# Patient Record
Sex: FEMALE | Race: WHITE | Employment: UNEMPLOYED | URBAN - METROPOLITAN AREA
[De-identification: names, ages, dates, MRNs, and addresses within clinical notes are randomized per-mention and may not be internally consistent; named-entity substitution may affect disease eponyms.]

---

## 2017-07-12 ENCOUNTER — HOSPITAL ENCOUNTER (OUTPATIENT)
Dept: LAB | Age: 57
Discharge: HOME | End: 2017-07-12
Payer: COMMERCIAL

## 2017-07-12 ENCOUNTER — OFFICE VISIT (OUTPATIENT)
Dept: HEMATOLOGY | Age: 57
End: 2017-07-12

## 2017-07-12 VITALS
TEMPERATURE: 98.6 F | HEART RATE: 84 BPM | WEIGHT: 268 LBS | RESPIRATION RATE: 16 BRPM | OXYGEN SATURATION: 96 % | DIASTOLIC BLOOD PRESSURE: 80 MMHG | SYSTOLIC BLOOD PRESSURE: 135 MMHG | BODY MASS INDEX: 38.37 KG/M2 | HEIGHT: 70 IN

## 2017-07-12 DIAGNOSIS — Q44.6 POLYCYSTIC LIVER DISEASE: ICD-10-CM

## 2017-07-12 DIAGNOSIS — Q44.6 POLYCYSTIC LIVER DISEASE: Primary | ICD-10-CM

## 2017-07-12 PROBLEM — I10 HYPERTENSION: Status: ACTIVE | Noted: 2017-07-12

## 2017-07-12 PROBLEM — Z98.890 H/O ARTHROSCOPIC KNEE SURGERY: Status: ACTIVE | Noted: 2017-07-12

## 2017-07-12 PROBLEM — N18.30 STAGE 3 CHRONIC KIDNEY DISEASE (HCC): Status: ACTIVE | Noted: 2017-07-12

## 2017-07-12 PROBLEM — Q61.3 POLYCYSTIC KIDNEY: Status: ACTIVE | Noted: 2017-07-12

## 2017-07-12 LAB
ALBUMIN SERPL BCP-MCNC: 3.9 G/DL (ref 3.4–5)
ALBUMIN/GLOB SERPL: 1.2 {RATIO} (ref 0.8–1.7)
ALP SERPL-CCNC: 93 U/L (ref 45–117)
ALT SERPL-CCNC: 54 U/L (ref 13–56)
ANION GAP BLD CALC-SCNC: 13 MMOL/L (ref 3–18)
AST SERPL W P-5'-P-CCNC: 48 U/L (ref 15–37)
BASOPHILS # BLD AUTO: 0.1 K/UL (ref 0–0.06)
BASOPHILS # BLD: 1 % (ref 0–2)
BILIRUB DIRECT SERPL-MCNC: 0.1 MG/DL (ref 0–0.2)
BILIRUB SERPL-MCNC: 0.5 MG/DL (ref 0.2–1)
BUN SERPL-MCNC: 23 MG/DL (ref 7–18)
BUN/CREAT SERPL: 16 (ref 12–20)
CALCIUM SERPL-MCNC: 9.2 MG/DL (ref 8.5–10.1)
CHLORIDE SERPL-SCNC: 103 MMOL/L (ref 100–108)
CO2 SERPL-SCNC: 25 MMOL/L (ref 21–32)
CREAT SERPL-MCNC: 1.4 MG/DL (ref 0.6–1.3)
DIFFERENTIAL METHOD BLD: ABNORMAL
EOSINOPHIL # BLD: 0.2 K/UL (ref 0–0.4)
EOSINOPHIL NFR BLD: 2 % (ref 0–5)
ERYTHROCYTE [DISTWIDTH] IN BLOOD BY AUTOMATED COUNT: 13.6 % (ref 11.6–14.5)
FERRITIN SERPL-MCNC: 60 NG/ML (ref 8–388)
GLOBULIN SER CALC-MCNC: 3.3 G/DL (ref 2–4)
GLUCOSE SERPL-MCNC: 92 MG/DL (ref 74–99)
HCT VFR BLD AUTO: 40 % (ref 35–45)
HGB BLD-MCNC: 13.1 G/DL (ref 12–16)
IRON SATN MFR SERPL: 18 %
IRON SERPL-MCNC: 73 UG/DL (ref 50–175)
LYMPHOCYTES # BLD AUTO: 21 % (ref 21–52)
LYMPHOCYTES # BLD: 1.4 K/UL (ref 0.9–3.6)
MCH RBC QN AUTO: 30.8 PG (ref 24–34)
MCHC RBC AUTO-ENTMCNC: 32.8 G/DL (ref 31–37)
MCV RBC AUTO: 94.1 FL (ref 74–97)
MONOCYTES # BLD: 0.4 K/UL (ref 0.05–1.2)
MONOCYTES NFR BLD AUTO: 6 % (ref 3–10)
NEUTS SEG # BLD: 4.7 K/UL (ref 1.8–8)
NEUTS SEG NFR BLD AUTO: 70 % (ref 40–73)
PLATELET # BLD AUTO: 301 K/UL (ref 135–420)
PMV BLD AUTO: 11.6 FL (ref 9.2–11.8)
POTASSIUM SERPL-SCNC: 4.6 MMOL/L (ref 3.5–5.5)
PROT SERPL-MCNC: 7.2 G/DL (ref 6.4–8.2)
RBC # BLD AUTO: 4.25 M/UL (ref 4.2–5.3)
SODIUM SERPL-SCNC: 141 MMOL/L (ref 136–145)
TIBC SERPL-MCNC: 410 UG/DL (ref 250–450)
WBC # BLD AUTO: 6.8 K/UL (ref 4.6–13.2)

## 2017-07-12 PROCEDURE — 82103 ALPHA-1-ANTITRYPSIN TOTAL: CPT | Performed by: INTERNAL MEDICINE

## 2017-07-12 PROCEDURE — 86706 HEP B SURFACE ANTIBODY: CPT | Performed by: INTERNAL MEDICINE

## 2017-07-12 PROCEDURE — 87340 HEPATITIS B SURFACE AG IA: CPT | Performed by: INTERNAL MEDICINE

## 2017-07-12 PROCEDURE — 86038 ANTINUCLEAR ANTIBODIES: CPT | Performed by: INTERNAL MEDICINE

## 2017-07-12 PROCEDURE — 83540 ASSAY OF IRON: CPT | Performed by: INTERNAL MEDICINE

## 2017-07-12 PROCEDURE — 86803 HEPATITIS C AB TEST: CPT | Performed by: INTERNAL MEDICINE

## 2017-07-12 PROCEDURE — 82728 ASSAY OF FERRITIN: CPT | Performed by: INTERNAL MEDICINE

## 2017-07-12 PROCEDURE — 85025 COMPLETE CBC W/AUTO DIFF WBC: CPT | Performed by: INTERNAL MEDICINE

## 2017-07-12 PROCEDURE — 80076 HEPATIC FUNCTION PANEL: CPT | Performed by: INTERNAL MEDICINE

## 2017-07-12 PROCEDURE — 80048 BASIC METABOLIC PNL TOTAL CA: CPT | Performed by: INTERNAL MEDICINE

## 2017-07-12 PROCEDURE — 36415 COLL VENOUS BLD VENIPUNCTURE: CPT | Performed by: INTERNAL MEDICINE

## 2017-07-12 PROCEDURE — 86704 HEP B CORE ANTIBODY TOTAL: CPT | Performed by: INTERNAL MEDICINE

## 2017-07-12 PROCEDURE — 83516 IMMUNOASSAY NONANTIBODY: CPT | Performed by: INTERNAL MEDICINE

## 2017-07-12 PROCEDURE — 86708 HEPATITIS A ANTIBODY: CPT | Performed by: INTERNAL MEDICINE

## 2017-07-12 RX ORDER — ENALAPRIL MALEATE 10 MG/1
TABLET ORAL DAILY
COMMUNITY

## 2017-07-12 NOTE — MR AVS SNAPSHOT
Visit Information Date & Time Provider Department Dept. Phone Encounter #  
 7/12/2017 10:00 AM Barrera Radford MD Via [address] [id number] Follow-up Instructions Return in about 6 weeks (around 8/23/2017) for MLS. Upcoming Health Maintenance Date Due Hepatitis C Screening 1960 DTaP/Tdap/Td series (1 - Tdap) 6/2/1979 PAP AKA CERVICAL CYTOLOGY 6/2/1979 BREAST CANCER SCRN MAMMOGRAM 6/2/2008 FOBT Q 1 YEAR AGE 50-75 6/2/2008 INFLUENZA AGE 9 TO ADULT 8/1/2017 Allergies as of 7/12/2017  Review Complete On: 7/12/2017 By: Carlos Choe Severity Noted Reaction Type Reactions Penicillins  07/12/2017    Hives Current Immunizations  Never Reviewed No immunizations on file. Not reviewed this visit You Were Diagnosed With   
  
 Codes Comments Polycystic liver disease    -  Primary ICD-10-CM: Q44.6 ICD-9-CM: 751.62 Vitals BP Pulse Temp Resp Height(growth percentile) Weight(growth percentile) 135/80 (BP 1 Location: Left arm, BP Patient Position: Sitting) 84 98.6 °F (37 °C) (Tympanic) 16 5' 10\" (1.778 m) 268 lb (121.6 kg) SpO2 BMI OB Status Smoking Status 96% 38.45 kg/m2 Menopause Never Smoker Vitals History BMI and BSA Data Body Mass Index Body Surface Area  
 38.45 kg/m 2 2.45 m 2 Your Updated Medication List  
  
   
This list is accurate as of: 7/12/17 11:50 AM.  Always use your most recent med list.  
  
  
  
  
 enalapril 10 mg tablet Commonly known as:  Dago Abernathy Take  by mouth daily. Follow-up Instructions Return in about 6 weeks (around 8/23/2017) for MLS. To-Do List   
 07/12/2017 Lab:  ACTIN (SMOOTH MUSCLE) ANTIBODY   
  
 07/12/2017 Lab:  ALPHA-1-ANTITRYPSIN, TOTAL   
  
 07/12/2017 Lab:  ANTINUCLEAR ANTIBODIES, IFA   
  
 07/12/2017 Lab:  CBC WITH AUTOMATED DIFF   
  
 07/12/2017 Lab:  FERRITIN   
  
 07/12/2017 Lab:  HCV AB W/REFLEX VERIFICATION   
  
 07/12/2017 Lab:  HEP A AB, TOTAL   
  
 07/12/2017 Lab:  HEP B SURFACE AB   
  
 07/12/2017 Lab:  HEP B SURFACE AG   
  
 07/12/2017 Lab:  HEPATIC FUNCTION PANEL   
  
 07/12/2017 Lab:  HEPATITIS B CORE AB, TOTAL   
  
 07/12/2017 Lab:  IRON PROFILE   
  
 07/12/2017 Lab:  METABOLIC PANEL, BASIC Introducing hospitals & HEALTH SERVICES! Gideon Avilez introduces [employer] patient portal. Now you can access parts of your medical record, email your doctor's office, and request medication refills online. 1. In your internet browser, go to [email or web address] 2. Click on the First Time User? Click Here link in the Sign In box. You will see the New Member Sign Up page. 3. Enter your [employer] Access Code exactly as it appears below. You will not need to use this code after youve completed the sign-up process. If you do not sign up before the expiration date, you must request a new code. · [employer] Access Code: [email or web address] Expires: 10/10/2017 11:50 AM 
 
4. Enter the last four digits of your Social Security Number (xxxx) and Date of Birth (mm/dd/yyyy) as indicated and click Submit. You will be taken to the next sign-up page. 5. Create a [employer] ID. This will be your [employer] login ID and cannot be changed, so think of one that is secure and easy to remember. 6. Create a [employer] password. You can change your password at any time. 7. Enter your Password Reset Question and Answer. This can be used at a later time if you forget your password. 8. Enter your e-mail address. You will receive e-mail notification when new information is available in [address] 9. Click Sign Up. You can now view and download portions of your medical record. 10. Click the Download Summary menu link to download a portable copy of your medical information. If you have questions, please visit the Frequently Asked Questions section of the [employer] website. Remember, [facility] is NOT to be used for urgent needs. For medical emergencies, dial 911. Now available from your iPhone and Android! Please provide this summary of care documentation to your next provider. Your primary care clinician is listed as Griffin So. If you have any questions after today's visit, please call [phone].

## 2017-07-12 NOTE — PROGRESS NOTES
[address] Andrade Agee MD, JÚNIOR Sharpe PA-C Frederick Barban, NP        at [address], [address] [address]  [address].     [phone]     FAX: [phone]    at [facility], [address] [place] [address], [address]     [phone]     FAX: [phone]         Patient Care Team:  Haim Carrasco MD as PCP - General (Family Practice)  Carlcooper Reich MD (Nephrology)      Problem List  Date Reviewed: 7/12/2017          Codes Class Noted    Polycystic liver disease ICD-10-CM: Q44.6  ICD-9-CM: 751.62  7/12/2017        Polycystic kidney ICD-10-CM: Q61.3  ICD-9-CM: 753.12  7/12/2017        Hypertension ICD-10-CM: I10  ICD-9-CM: 401.9  7/12/2017        H/O arthroscopic knee surgery ICD-10-CM: Z98.890  ICD-9-CM: V45.89  7/12/2017                The physicians listed above have asked me to see Jamaal Garcia in consultation regarding a liver cyst.  All medical records sent by the referring physicians were reviewed including imaging studies     The patient is a 59 y.o.  female with Polycystic liver and polycystic kidney disease. A CT scan demonstrated massive hepatomegaly with hundreds cysts of various sizes throughout the liver. The patient was referred to [facility] in 2016 for possible aspiration of cysts. A few of the larger cysts were drained but this did nothing to reliove the abdominal full and distention. The most recent laboratory studies indicate that the liver transaminases are elevated, ALP is normal, tests of hepatic synthetic and metabolic function are normal, and the platelet count is normal.    The patient notes fatigue, diffuse abdominal pain, marked abdominal distention. The patient completes all daily activities without any functional limitations.       The patient has not experienced fevers, chills, ALLERGIES  Allergies   Allergen Reactions    Penicillins Hives       MEDICATIONS  Current Outpatient Prescriptions   Medication Sig    enalapril (VASOTEC) 10 mg tablet Take  by mouth daily. No current facility-administered medications for this visit. SYSTEM REVIEW NOT RELATED TO LIVER DISEASE OR REVIEWED ABOVE:  Constitution systems: Negative for fever, chills, weight gain, weight loss. Eyes: Negative for visual changes. ENT: Negative for sore throat, painful swallowing. Respiratory: Negative for cough, hemoptysis, SOB. Cardiology: Negative for chest pain, palpitations. GI:  Negative for constipation or diarrhea. : Negative for urinary frequency, dysuria, hematuria, nocturia. Skin: Negative for rash. Hematology: Negative for easy bruising, blood clots. Musculo-skelatal: Negative for back pain, muscle pain, weakness. Neurologic: Negative for headaches, dizziness, vertigo, memory problems not related to HE. Psychology: Negative for anxiety, depression. FAMILY HISTORY:  The father  of PKD. The mother  at age 80 years. Two brothers have PKD. ONe has had a renal transplant. The other is on dialysis. There is no family history of cystic disease of the liver. SOCIAL HISTORY:  The patient is . The patient has 2 children,   The patient has never used tobacco products. The patient consumes alcohol on social occasions never in excess. The patient does not work. PHYSICAL EXAMINATION:  Visit Vitals    /80 (BP 1 Location: Left arm, BP Patient Position: Sitting)    Pulse 84    Temp 98.6 °F (37 °C) (Tympanic)    Resp 16    Ht 5' 10\" (1.778 m)    Wt 268 lb (121.6 kg)    SpO2 96%    BMI 38.45 kg/m2     General: No acute distress. Eyes: Sclera anicteric. ENT: No oral lesions. Thyroid normal.  Nodes: No adenopathy. Skin: No spider angiomata. No jaundice. No palmar erythema. Respiratory: Lungs clear to auscultation. Cardiovascular: Regular heart rate. No murmurs. No JVD. Abdomen: markedly distended. The liver is hard. Hepatomegaly with the liver extending half way down the abdomen and across the midline. Extremities: No edema. No muscle wasting. No gross arthritic changes. Neurologic: Alert and oriented. Cranial nerves grossly intact. No asterixis. LABORATORY STUDIES:  From 6/2016  AST/ALT/ALP/T Bili/ALB:  41/43/84/0.6/4.0    [facility] Latest Ref Rng & Units 7/12/2017   WBC 4.6 - 13.2 K/uL 6.8   ANC 1.8 - 8.0 K/UL 4.7   HGB 12.0 - 16.0 g/dL 13.1    - 420 K/uL 301   AST 15 - 37 U/L 48 (H)   ALT 13 - 56 U/L 54   Alk Phos 45 - 117 U/L 93   Bili, Total 0.2 - 1.0 MG/DL 0.5   Bili, Direct 0.0 - 0.2 MG/DL 0.1   Albumin 3.4 - 5.0 g/dL 3.9   BUN 7.0 - 18 MG/DL 23 (H)   Creat 0.6 - 1.3 MG/DL 1.40 (H)   Na 136 - 145 mmol/L 141   K 3.5 - 5.5 mmol/L 4.6   Cl 100 - 108 mmol/L 103   CO2 21 - 32 mmol/L 25   Glucose 74 - 99 mg/dL 92     SEROLOGIES:  Not available or performed. Testing was performed today. LIVER HISTOLOGY:  Not available or performed    ENDOSCOPIC PROCEDURES:  Not available or performed    RADIOLOGY:  12/2015. Ultrasound of liver. Hepatomegaly. Numerous cysts throughout the liver and kidney consistent with polycystic liver and renal disease. OTHER TESTING:  Not available or performed    ASSESSMENT AND PLAN:  Polycystic liver disease     Liver transaminases are elevated. Alkaline phosphatase is normal.  Liver function is normal.  The platelet count is normal.      Will perform laboratory testing to monitor liver function and degree of liver injury. This will include BMP, hepatic panel, CBC with platelet count,     It is distinctly unusual for patients with polycystic liver disease to have elevations in liver transaminases. Will perform serologic serologic and virologic studies to assess for various causes of chronic liver disease.     The patient is is very symptomatic from the very large liver with marked abdominal distention and abdominal pain. She does not have early satiety and weight loss. Discussed the natural history of polycystic liver and that there is no good treatment for the disorder short of liver transplant. Discussed that polycystic lvier does not lead to chronic liver disease and liver failure. This is different than polycystic renal disease which is common cause of ESRD. Discussed the role of liver transplant. Discussed living donor liver transplant. Discussed surgical resection of the liver to reduce the size and  That the liver simply regenerates back to its former size within a few months. There are no contraindications for the patient to take any medications that are necessary for treatment of other medical issues. The need for vaccination against viral hepatitis A and B will be assessed with serologic and instituted as appropriate. All of the above issues were discussed with the patient. All questions were answered. The patient expressed a clear understanding of the above. [address] [address] 6 weeks to review all data and determine the treatment plan.     Aura Romberg, MD  Liver [place] of [address] [place] [address], [address] [place] [address] Hilary Manzo, [address]  [phone]

## 2017-07-13 LAB
A1AT SERPL-MCNC: 161 MG/DL (ref 90–200)
ACTIN IGG SERPL-ACNC: 15 UNITS (ref 0–19)
ANA TITR SER IF: NEGATIVE {TITER}
COMMENT, 144067: NORMAL
HAV AB SER QL IA: NEGATIVE
HBV CORE AB SERPL QL IA: NEGATIVE
HBV SURFACE AB SER QL IA: NEGATIVE
HBV SURFACE AB SERPL IA-ACNC: <3.1 MIU/ML
HBV SURFACE AG SER QL: <0.1 INDEX
HBV SURFACE AG SER QL: NEGATIVE
HCV AB S/CO SERPL IA: <0.1 S/CO RATIO (ref 0–0.9)
HEP BS AB COMMENT,HBSAC: ABNORMAL

## 2017-08-30 ENCOUNTER — OFFICE VISIT (OUTPATIENT)
Dept: HEMATOLOGY | Age: 57
End: 2017-08-30

## 2017-08-30 VITALS
OXYGEN SATURATION: 100 % | DIASTOLIC BLOOD PRESSURE: 82 MMHG | TEMPERATURE: 98.9 F | WEIGHT: 266 LBS | SYSTOLIC BLOOD PRESSURE: 148 MMHG | HEART RATE: 85 BPM | HEIGHT: 70 IN | RESPIRATION RATE: 18 BRPM | BODY MASS INDEX: 38.08 KG/M2

## 2017-08-30 DIAGNOSIS — Q44.6 POLYCYSTIC LIVER DISEASE: Primary | ICD-10-CM

## 2017-08-30 NOTE — PROGRESS NOTES
[address] Samara Perez MD, [address], [place], [place]       JÚNIOR Cook PA-C [facility]   JÚNIOR Mercado NP        [address], [address] [address]     [phone]     FAX: [phone]    at [address], [address] [place] [address], [address]     [phone]     FAX: [phone]       Patient Care Team:  Ida Tamayo MD as PCP - General (Family Practice)  Andre Paul MD (Nephrology)      Problem List  Date Reviewed: 7/12/2017          Codes Class Noted    Polycystic liver disease ICD-10-CM: Q44.6  ICD-9-CM: 751.62  7/12/2017        Polycystic kidney ICD-10-CM: Q61.3  ICD-9-CM: 753.12  7/12/2017        Hypertension ICD-10-CM: I10  ICD-9-CM: 401.9  7/12/2017        H/O arthroscopic knee surgery ICD-10-CM: Z98.890  ICD-9-CM: V45.89  7/12/2017        Stage 3 chronic kidney disease ICD-10-CM: N18.3  ICD-9-CM: 585.3  7/12/2017                Carlito Sandoval returns to the [address] for management of a Polycystic liver disease. The active problem list, all pertinent past medical history, medications, radiologic findings and laboratory findings related to the liver disorder were reviewed with the patient. The patient is a 59 y.o.  female with Polycystic liver and polycystic kidney disease. A CT scan demonstrated massive hepatomegaly with hundreds of cysts of various sizes throughout the liver. [facility]. A few of the larger cysts were drained but this did nothing to relieve the abdominal fullness and distention.     The most recent laboratory studies indicate that the liver transaminases are elevated, ALP is normal, tests of hepatic synthetic and metabolic function are normal, and the platelet count is normal.    The patient notes fatigue, diffuse abdominal pain, marked abdominal distention. The patient completes all daily activities without any functional limitations. The patient has not experienced fevers, chills,       ALLERGIES  Allergies   Allergen Reactions    Penicillins Hives       MEDICATIONS  Current Outpatient Prescriptions   Medication Sig    enalapril (VASOTEC) 10 mg tablet Take  by mouth daily. No current facility-administered medications for this visit. SYSTEM REVIEW NOT RELATED TO LIVER DISEASE OR REVIEWED ABOVE:  Constitution systems: Negative for fever, chills, weight gain, weight loss. Eyes: Negative for visual changes. ENT: Negative for sore throat, painful swallowing. Respiratory: Negative for cough, hemoptysis, SOB. Cardiology: Negative for chest pain, palpitations. GI:  Negative for constipation or diarrhea. : Negative for urinary frequency, dysuria, hematuria, nocturia. Skin: Negative for rash. Hematology: Negative for easy bruising, blood clots. Musculo-skelatal: Negative for back pain, muscle pain, weakness. Neurologic: Negative for headaches, dizziness, vertigo, memory problems not related to HE. Psychology: Negative for anxiety, depression. FAMILY HISTORY:  The father  of PKD. The mother  at age 80 years. Two brothers have PKD. ONe has had a renal transplant. The other is on dialysis. There is no family history of cystic disease of the liver. SOCIAL HISTORY:  The patient is . The patient has 2 children,   The patient has never used tobacco products. The patient consumes alcohol on social occasions never in excess. The patient does not work.         PHYSICAL EXAMINATION:  Visit Vitals    /82 (BP 1 Location: Right arm, BP Patient Position: Sitting)    Pulse 85    Temp 98.9 °F (37.2 °C) (Tympanic)    Resp 18    Ht 5' 10\" (1.778 m)    Wt 266 lb (120.7 kg)    SpO2 100%    BMI 38.17 kg/m2     General: No acute distress. Eyes: Sclera anicteric. ENT: No oral lesions. Thyroid normal.  Nodes: No adenopathy. Skin: No spider angiomata. No jaundice. No palmar erythema. Respiratory: Lungs clear to auscultation. Cardiovascular: Regular heart rate. No murmurs. No JVD. Abdomen: Markedly distended. The liver is hard. Hepatomegaly with the liver extending half way down the abdomen and across the midline. Extremities: No edema. No muscle wasting. No gross arthritic changes. Neurologic: Alert and oriented. Cranial nerves grossly intact. No asterixis. LABORATORY STUDIES:  From 6/2016  AST/ALT/ALP/T Bili/ALB:  41/43/84/0.6/4.0    Liver [facility] Ref Rng & Units 7/12/2017   WBC 4.6 - 13.2 K/uL 6.8   ANC 1.8 - 8.0 K/UL 4.7   HGB 12.0 - 16.0 g/dL 13.1    - 420 K/uL 301   AST 15 - 37 U/L 48 (H)   ALT 13 - 56 U/L 54   Alk Phos 45 - 117 U/L 93   Bili, Total 0.2 - 1.0 MG/DL 0.5   Bili, Direct 0.0 - 0.2 MG/DL 0.1   Albumin 3.4 - 5.0 g/dL 3.9   BUN 7.0 - 18 MG/DL 23 (H)   Creat 0.6 - 1.3 MG/DL 1.40 (H)   Na 136 - 145 mmol/L 141   K 3.5 - 5.5 mmol/L 4.6   Cl 100 - 108 mmol/L 103   CO2 21 - 32 mmol/L 25   Glucose 74 - 99 mg/dL 92     SEROLOGIES:  Serologies Latest Ref Rng & Units 7/12/2017   Hep A Ab, Total NEGATIVE   NEGATIVE   Hep B Surface Ag <1.00 Index <0.10   Hep B Surface Ag Interp NEG   NEGATIVE   Hep B Core Ab, Total NEGATIVE   NEGATIVE   Hep B Surface Ab >10.0 mIU/mL <3.10 (L)   Hep B Surface Ab Interp POS   NEGATIVE (A)   Hep C Ab 0.0 - 0.9 s/co ratio <0.1   Ferritin 8 - 388 NG/ML 60   Iron % Saturation % 18   GLORIA, IFA  NEGATIVE   ASMCA 0 - 19 Units 15   Alpha-1 antitrypsin level 90 - 200 mg/dL 161     LIVER HISTOLOGY:  Not available or performed    ENDOSCOPIC PROCEDURES:  Not available or performed    RADIOLOGY:  12/2015. Ultrasound of liver. Hepatomegaly. Numerous cysts throughout the liver and kidney consistent with polycystic liver and renal disease. 5/2017. Ultrasound of liver. Hepatomegaly. Numerous cysts throughout the liver. OTHER TESTING:  Not available or performed    ASSESSMENT AND PLAN:  Polycystic liver disease     The AST is elevated. The ALT is normal.  Alkaline phosphatase is normal.  Liver function is normal.  The platelet count is normal.      Serologic tests for various causes of chronic liver disease are negative. The etiology for the elevated AST is not clear at this time. The patient is is very symptomatic from the very large liver with marked abdominal distention and abdominal pain. She does not have early satiety and weight loss. Discussed the natural history of polycystic liver and that there is no good treatment for the disorder short of liver transplant. Discussed that polycystic lvier does not lead to chronic liver disease and liver failure. This is different than polycystic renal disease which is common cause of ESRD. Discussed the role of liver transplant. Discussed living donor liver transplant. Discussed surgical resection of the liver to reduce the size and  That the liver simply regenerates back to its former size within a few months. There are no contraindications for the patient to take any medications that are necessary for treatment of other medical issues. The need for vaccination against viral hepatitis A and B will be assessed with serologic and instituted as appropriate. All of the above issues were discussed with the patient. All questions were answered. The patient expressed a clear understanding of the above. [facility] [address] 6 weeks to review all data and determine the treatment plan.     John Paul Bowens MD  Liver [place] of [address] [place] [address], [address] [place] [address] Street [address]  [phone]

## 2018-05-30 DIAGNOSIS — Q44.6 POLYCYSTIC LIVER DISEASE: Primary | ICD-10-CM

## 2018-08-06 ENCOUNTER — DOCUMENTATION ONLY (OUTPATIENT)
Dept: HEMATOLOGY | Age: 58
End: 2018-08-06

## 2018-08-06 ENCOUNTER — HOSPITAL ENCOUNTER (OUTPATIENT)
Dept: LAB | Age: 58
Discharge: HOME | End: 2018-08-06
Payer: COMMERCIAL

## 2018-08-06 DIAGNOSIS — Q44.6 POLYCYSTIC LIVER DISEASE: ICD-10-CM

## 2018-08-06 LAB
ALBUMIN SERPL-MCNC: 3.5 G/DL (ref 3.4–5)
ALBUMIN/GLOB SERPL: 1.1 {RATIO} (ref 0.8–1.7)
ALP SERPL-CCNC: 96 U/L (ref 45–117)
ALT SERPL-CCNC: 56 U/L (ref 13–56)
ANION GAP SERPL CALC-SCNC: 4 MMOL/L (ref 3–18)
AST SERPL-CCNC: 43 U/L (ref 15–37)
BILIRUB DIRECT SERPL-MCNC: 0.1 MG/DL (ref 0–0.2)
BILIRUB SERPL-MCNC: 0.4 MG/DL (ref 0.2–1)
BUN SERPL-MCNC: 30 MG/DL (ref 7–18)
BUN/CREAT SERPL: 21 (ref 12–20)
CALCIUM SERPL-MCNC: 9.4 MG/DL (ref 8.5–10.1)
CHLORIDE SERPL-SCNC: 106 MMOL/L (ref 100–108)
CO2 SERPL-SCNC: 32 MMOL/L (ref 21–32)
CREAT SERPL-MCNC: 1.42 MG/DL (ref 0.6–1.3)
GLOBULIN SER CALC-MCNC: 3.3 G/DL (ref 2–4)
GLUCOSE SERPL-MCNC: 98 MG/DL (ref 74–99)
POTASSIUM SERPL-SCNC: 4.8 MMOL/L (ref 3.5–5.5)
PROT SERPL-MCNC: 6.8 G/DL (ref 6.4–8.2)
SODIUM SERPL-SCNC: 142 MMOL/L (ref 136–145)

## 2018-08-06 PROCEDURE — 36415 COLL VENOUS BLD VENIPUNCTURE: CPT | Performed by: INTERNAL MEDICINE

## 2018-08-06 PROCEDURE — 80076 HEPATIC FUNCTION PANEL: CPT | Performed by: INTERNAL MEDICINE

## 2018-08-06 PROCEDURE — 80048 BASIC METABOLIC PNL TOTAL CA: CPT | Performed by: INTERNAL MEDICINE

## 2018-08-06 NOTE — PROGRESS NOTES
Patient came in to get labs drawn 08/06/18 7:56am, patient requested only getting hepatic function panel drawn due to already have cbc and metabolic drawn at [facility] prior to date . Shahzad Sánchez Verified with patient and Jose Garibay, ! Patient has appt on 8/15/18 and will show copy of results on phone when come in for appt.

## 2018-08-15 ENCOUNTER — OFFICE VISIT (OUTPATIENT)
Dept: HEMATOLOGY | Age: 58
End: 2018-08-15

## 2018-08-15 VITALS
SYSTOLIC BLOOD PRESSURE: 137 MMHG | RESPIRATION RATE: 16 BRPM | DIASTOLIC BLOOD PRESSURE: 73 MMHG | WEIGHT: 278 LBS | HEIGHT: 70 IN | HEART RATE: 76 BPM | TEMPERATURE: 97.9 F | OXYGEN SATURATION: 98 % | BODY MASS INDEX: 39.8 KG/M2

## 2018-08-15 DIAGNOSIS — Q44.6 POLYCYSTIC LIVER DISEASE: Primary | ICD-10-CM

## 2018-08-15 NOTE — PROGRESS NOTES
[address], [place], [address], [place], [place]       Philip Viera, KASSI Shukla, [facility]   JÚNIOR Gutierrez NP [address] [facility] 136    at [facility] AT [address], [address] Roxana Briscoe  22.    [phone]    FAX: [address] [place] [address], [address]    [phone]    FAX: [phone]         Patient Care Team:  Staci Rose MD as PCP - General (Family Practice)  Nigel Navarro MD (Nephrology)      Problem List  Date Reviewed: 11/14/2017          Codes Class Noted    Polycystic liver disease ICD-10-CM: Q44.6  ICD-9-CM: 751.62  7/12/2017        Polycystic kidney ICD-10-CM: Q61.3  ICD-9-CM: 753.12  7/12/2017        Hypertension ICD-10-CM: I10  ICD-9-CM: 401.9  7/12/2017        H/O arthroscopic knee surgery ICD-10-CM: Z98.890  ICD-9-CM: V45.89  7/12/2017        Stage 3 chronic kidney disease ICD-10-CM: N18.3  ICD-9-CM: 585.3  7/12/2017                Catrachito Taylor returns to the [address] for management of a Polycystic liver disease. The active problem list, all pertinent past medical history, medications, radiologic findings and laboratory findings related to the liver disorder were reviewed with the patient. The patient is a 59 y.o.  female with Polycystic liver and polycystic kidney disease. A CT scan demonstrated massive hepatomegaly with hundreds of cysts of various sizes throughout the liver. The patient was referred to [facility] in 2016 for possible aspiration of cysts. A few of the larger cysts were drained but this did nothing to relieve the abdominal fullness and distention.     The patient notes fatigue, diffuse abdominal pain, marked abdominal distention. The patient completes all daily activities without any functional limitations. The patient has not experienced fevers, chills,       ASSESSMENT AND PLAN:  Polycystic liver disease   The AST is elevated. The ALT is normal.  Alkaline phosphatase is normal.  Liver function is normal.  The platelet count is normal.    Serologic tests for various causes of chronic liver disease are negative. The patient is is very symptomatic from the very large liver with marked abdominal distention and abdominal pain. She does not have early satiety and weight loss. Discussed the natural history of polycystic liver and that there is no good treatment for the disorder short of liver transplant. Discussed that polycystic liver does not lead to chronic liver disease and liver failure. This is different than polycystic renal disease which is common cause of ESRD. Discussed the role of liver transplant. Discussed living donor liver transplant. Discussed surgical resection of the liver to reduce the size and  That the liver simply regenerates back to its former size within a few months. She is starting to develop progressive CKD from polycystic kidney disease. IF and when she needs a renal trnasplant I would suggest she be considered for simultaneous renal-liver transplants. Treatment of other medical problems in patients with chronic liver disease  There are no contraindications for the patient to take any medications that are necessary for treatment of other medical issues. Counseling for alcohol in patients with chronic liver disease  The patient was counseled regarding alcohol consumption and the effect of alcohol on chronic liver disease. The patient does not consume any significant amount of alcohol. Vaccinations   Vaccination for viral hepatitis A and B is recommended since the patient has no serologic evidence of previous exposure or vaccination with immunity.   Routine vaccinations against other bacterial and viral agents can be performed as indicated. Annual flu vaccination should be administered if indicated. ALLERGIES  Allergies   Allergen Reactions    Penicillins Hives       MEDICATIONS  Current Outpatient Prescriptions   Medication Sig    enalapril (VASOTEC) 10 mg tablet Take  by mouth daily. No current facility-administered medications for this visit. SYSTEM REVIEW NOT RELATED TO LIVER DISEASE OR REVIEWED ABOVE:  Constitution systems: Negative for fever, chills, weight gain, weight loss. Eyes: Negative for visual changes. ENT: Negative for sore throat, painful swallowing. Respiratory: Negative for cough, hemoptysis, SOB. Cardiology: Negative for chest pain, palpitations. GI:  Negative for constipation or diarrhea. : Negative for urinary frequency, dysuria, hematuria, nocturia. Skin: Negative for rash. Hematology: Negative for easy bruising, blood clots. Musculo-skelatal: Negative for back pain, muscle pain, weakness. Neurologic: Negative for headaches, dizziness, vertigo, memory problems not related to HE. Psychology: Negative for anxiety, depression. FAMILY HISTORY:  The father  of PKD. The mother  at age 80 years. Two brothers have PKD. ONe has had a renal transplant. The other is on dialysis. There is no family history of cystic disease of the liver. SOCIAL HISTORY:  The patient is . The patient has 2 children,   The patient has never used tobacco products. The patient consumes alcohol on social occasions never in excess. The patient does not work. PHYSICAL EXAMINATION:  Visit Vitals    /73 (BP 1 Location: Left arm, BP Patient Position: Sitting)    Pulse 76    Temp 97.9 °F (36.6 °C) (Tympanic)    Resp 16    Ht 5' 10\" (1.778 m)    Wt 278 lb (126.1 kg)    SpO2 98%    BMI 39.89 kg/m2     General: No acute distress. Eyes: Sclera anicteric. ENT: No oral lesions.   Thyroid normal.  Nodes: No adenopathy. Skin: No spider angiomata. No jaundice. No palmar erythema. Respiratory: Lungs clear to auscultation. Cardiovascular: Regular heart rate. No murmurs. No JVD. Abdomen: Markedly distended. The liver is hard. Hepatomegaly with the liver extending half way down the abdomen and across the midline. Extremities: No edema. No muscle wasting. No gross arthritic changes. Neurologic: Alert and oriented. Cranial nerves grossly intact. No asterixis. LABORATORY STUDIES:  Liver [place] of [address] [place] [address] Units 8/6/2018 7/12/2017   WBC 4.6 - 13.2 K/uL  6.8   ANC 1.8 - 8.0 K/UL  4.7   HGB 12.0 - 16.0 g/dL  13.1    - 420 K/uL  301   AST 15 - 37 U/L 43 (H) 48 (H)   ALT 13 - 56 U/L 56 54   Alk Phos 45 - 117 U/L 96 93   Bili, Total 0.2 - 1.0 MG/DL 0.4 0.5   Bili, Direct 0.0 - 0.2 MG/DL 0.1 0.1   Albumin 3.4 - 5.0 g/dL 3.5 3.9   BUN 7.0 - 18 MG/DL 30 (H) 23 (H)   Creat 0.6 - 1.3 MG/DL 1.42 (H) 1.40 (H)   Na 136 - 145 mmol/L 142 141   K 3.5 - 5.5 mmol/L 4.8 4.6   Cl 100 - 108 mmol/L 106 103   CO2 21 - 32 mmol/L 32 25   Glucose 74 - 99 mg/dL 98 92       SEROLOGIES:  Serologies Latest Ref Rng & Units 7/12/2017   Hep A Ab, Total NEGATIVE   NEGATIVE   Hep B Surface Ag <1.00 Index <0.10   Hep B Surface Ag Interp NEG   NEGATIVE   Hep B Core Ab, Total NEGATIVE   NEGATIVE   Hep B Surface Ab >10.0 mIU/mL <3.10 (L)   Hep B Surface Ab Interp POS   NEGATIVE (A)   Hep C Ab 0.0 - 0.9 s/co ratio <0.1   Ferritin 8 - 388 NG/ML 60   Iron % Saturation % 18   GLORIA, IFA  NEGATIVE   ASMCA 0 - 19 Units 15   Alpha-1 antitrypsin level 90 - 200 mg/dL 161     LIVER HISTOLOGY:  Not available or performed    ENDOSCOPIC PROCEDURES:  Not available or performed    RADIOLOGY:  12/2015. Ultrasound of liver. Hepatomegaly. Numerous cysts throughout the liver and kidney consistent with polycystic liver and renal disease. 5/2017. Ultrasound of liver. Hepatomegaly. Numerous cysts throughout the liver.       OTHER TESTING:  Not available or performed    FOLLOW-UP:  All of the issues listed above in the Assessment and Plan were discussed with the patient. All questions were answered. The patient expressed a clear understanding of the above.     19[address] [facility] 87 in 12 months       Martha Khan [place]  [address] [address] [facility], 300 [facility] [address]  [address] [place] [address]

## 2018-08-15 NOTE — MR AVS SNAPSHOT
[address] [place] [address] 
[phone] Patient: Racehl Wei MRN: [id number] Visit Information Date & Time Provider Department Dept. Phone Encounter #  
 8/15/2018 12:00 PM Rad Solis MD [address] [place] [address] of [address] [id number] Upcoming Health Maintenance Date Due DTaP/Tdap/Td series (1 - Tdap) 1979 PAP AKA CERVICAL CYTOLOGY 1979 BREAST CANCER SCRN MAMMOGRAM 2008 FOBT Q 1 YEAR AGE 50-75 2008 ZOSTER VACCINE AGE 60> 2018 Influenza Age 5 to Adult 2018 Allergies as of 8/15/2018  Review Complete On: 8/15/2018 By: Todd Corral Severity Noted Reaction Type Reactions Penicillins  2017    Hives Current Immunizations  Never Reviewed No immunizations on file. Not reviewed this visit Vitals BP Pulse Temp Resp Height(growth percentile)  
 137/73 (BP 1 Location: Left arm, BP Patient Position: Sitting) 76 97.9 °F (36.6 °C) (Tympanic) 16 5' 10\" (1.778 m) Weight(growth percentile) SpO2 BMI OB Status Smoking Status 278 lb (126.1 kg) 98% 39.89 kg/m2 Menopause Never Smoker Vitals History BMI and BSA Data Body Mass Index Body Surface Area  
 39.89 kg/m 2 2.5 m 2 Your Updated Medication List  
  
   
This list is accurate as of 8/15/18  1:09 PM.  Always use your most recent med list.  
  
  
  
  
 enalapril 10 mg tablet Commonly known as:  Elvin Jackson Take  by mouth daily. Introducing [facility]! [facility] introduces [employer] patient portal. Now you can access parts of your medical record, email your doctor's office, and request medication refills online. 1. In your internet browser, go to [email or web address] 2. Click on the First Time User? Click Here link in the Sign In box. You will see the New Member Sign Up page. 3. Enter your [employer] Access Code exactly as it appears below. You will not need to use this code after youve completed the sign-up process. If you do not sign up before the expiration date, you must request a new code. · [employer] Access Code: [email or web address] Expires: 11/13/2018  1:08 PM 
 
4. Enter the last four digits of your Social Security Number (xxxx) and Date of Birth (mm/dd/yyyy) as indicated and click Submit. You will be taken to the next sign-up page. 5. Create a [employer] ID. This will be your [employer] login ID and cannot be changed, so think of one that is secure and easy to remember. 6. Create a [employer] password. You can change your password at any time. 7. Enter your Password Reset Question and Answer. This can be used at a later time if you forget your password. 8. Enter your e-mail address. You will receive e-mail notification when new information is available in [address] 9. Click Sign Up. You can now view and download portions of your medical record. 10. Click the Download Summary menu link to download a portable copy of your medical information. If you have questions, please visit the Frequently Asked Questions section of the [employer] website. Remember, [employer] is NOT to be used for urgent needs. For medical emergencies, dial 911. Now available from your iPhone and Android! Please provide this summary of care documentation to your next provider. Your primary care clinician is listed as Floresita Ramirez. If you have any questions after today's visit, please call [phone].

## 2019-07-11 ENCOUNTER — TELEPHONE (OUTPATIENT)
Dept: HEMATOLOGY | Age: 59
End: 2019-07-11

## 2019-07-11 DIAGNOSIS — Q44.6 POLYCYSTIC LIVER DISEASE: Primary | ICD-10-CM

## 2019-07-11 NOTE — TELEPHONE ENCOUNTER
Patient called requesting a lab order to be put in so she can get labs prior to the upcoming visit. Put in a repeat of last labs and pending to provider.

## 2019-07-13 DIAGNOSIS — Q44.6 POLYCYSTIC LIVER DISEASE: Primary | ICD-10-CM

## 2019-08-14 ENCOUNTER — HOSPITAL ENCOUNTER (OUTPATIENT)
Dept: LAB | Age: 59
Discharge: HOME | End: 2019-08-14
Payer: COMMERCIAL

## 2019-08-14 LAB
ALBUMIN SERPL-MCNC: 3.5 G/DL (ref 3.4–5)
ALBUMIN/GLOB SERPL: 1.1 {RATIO} (ref 0.8–1.7)
ALP SERPL-CCNC: 106 U/L (ref 45–117)
ALT SERPL-CCNC: 56 U/L (ref 13–56)
ANION GAP SERPL CALC-SCNC: 8 MMOL/L (ref 3–18)
AST SERPL-CCNC: 29 U/L (ref 10–38)
BASOPHILS # BLD: 0.1 K/UL (ref 0–0.1)
BASOPHILS NFR BLD: 1 % (ref 0–2)
BILIRUB SERPL-MCNC: 0.5 MG/DL (ref 0.2–1)
BUN SERPL-MCNC: 30 MG/DL (ref 7–18)
BUN/CREAT SERPL: 19 (ref 12–20)
CALCIUM SERPL-MCNC: 9.2 MG/DL (ref 8.5–10.1)
CHLORIDE SERPL-SCNC: 107 MMOL/L (ref 100–111)
CO2 SERPL-SCNC: 28 MMOL/L (ref 21–32)
CREAT SERPL-MCNC: 1.58 MG/DL (ref 0.6–1.3)
DIFFERENTIAL METHOD BLD: ABNORMAL
EOSINOPHIL # BLD: 0.2 K/UL (ref 0–0.4)
EOSINOPHIL NFR BLD: 3 % (ref 0–5)
ERYTHROCYTE [DISTWIDTH] IN BLOOD BY AUTOMATED COUNT: 13.9 % (ref 11.6–14.5)
GLOBULIN SER CALC-MCNC: 3.3 G/DL (ref 2–4)
GLUCOSE SERPL-MCNC: 89 MG/DL (ref 74–99)
HCT VFR BLD AUTO: 39.8 % (ref 35–45)
HGB BLD-MCNC: 12.8 G/DL (ref 12–16)
LYMPHOCYTES # BLD: 1.3 K/UL (ref 0.9–3.6)
LYMPHOCYTES NFR BLD: 20 % (ref 21–52)
MCH RBC QN AUTO: 29.8 PG (ref 24–34)
MCHC RBC AUTO-ENTMCNC: 32.2 G/DL (ref 31–37)
MCV RBC AUTO: 92.8 FL (ref 74–97)
MONOCYTES # BLD: 0.4 K/UL (ref 0.05–1.2)
MONOCYTES NFR BLD: 6 % (ref 3–10)
NEUTS SEG # BLD: 4.5 K/UL (ref 1.8–8)
NEUTS SEG NFR BLD: 70 % (ref 40–73)
PLATELET # BLD AUTO: 289 K/UL (ref 135–420)
PMV BLD AUTO: 11.1 FL (ref 9.2–11.8)
POTASSIUM SERPL-SCNC: 4.6 MMOL/L (ref 3.5–5.5)
PROT SERPL-MCNC: 6.8 G/DL (ref 6.4–8.2)
RBC # BLD AUTO: 4.29 M/UL (ref 4.2–5.3)
SODIUM SERPL-SCNC: 143 MMOL/L (ref 136–145)
WBC # BLD AUTO: 6.4 K/UL (ref 4.6–13.2)

## 2019-08-14 PROCEDURE — 80053 COMPREHEN METABOLIC PANEL: CPT

## 2019-08-14 PROCEDURE — 36415 COLL VENOUS BLD VENIPUNCTURE: CPT

## 2019-08-14 PROCEDURE — 85025 COMPLETE CBC W/AUTO DIFF WBC: CPT

## 2019-08-19 ENCOUNTER — OFFICE VISIT (OUTPATIENT)
Dept: HEMATOLOGY | Age: 59
End: 2019-08-19

## 2019-08-19 VITALS
DIASTOLIC BLOOD PRESSURE: 87 MMHG | HEIGHT: 70 IN | SYSTOLIC BLOOD PRESSURE: 132 MMHG | OXYGEN SATURATION: 98 % | TEMPERATURE: 99.4 F | WEIGHT: 287 LBS | HEART RATE: 87 BPM | RESPIRATION RATE: 16 BRPM | BODY MASS INDEX: 41.09 KG/M2

## 2019-08-19 DIAGNOSIS — Q44.6 POLYCYSTIC LIVER DISEASE: Primary | ICD-10-CM

## 2019-08-19 NOTE — Clinical Note
11/17/19 Patient: Kayla Chiu YOB: 1960 Date of Visit: 8/19/2019 Guera Garcia MD 
[facility] [address] VIA Facsimile: [phone] Dear Guera Garcia MD, Thank you for referring Ms. Kayla Chiu to [address] [facility] Bertin Perez for evaluation. My notes for this consultation are attached. If you have questions, please do not hesitate to call me. I look forward to following your patient along with you. Sincerely, Jonatan De Anda MD

## 2019-08-19 NOTE — PROGRESS NOTES
Connie Ojeda is a 62 y.o. female      1. Have you been to the ER, urgent care clinic or hospitalized since your last visit? NO.     2. Have you seen or consulted any other health care providers outside of the [address] [place] [address] since your last visit (Include any pap smears or colon screening)?  NO          Learning Assessment 7/12/2017   PRIMARY LEARNER Patient   PRIMARY LANGUAGE ENGLISH   LEARNER PREFERENCE PRIMARY DEMONSTRATION   ANSWERED BY self   RELATIONSHIP SELF

## 2019-08-19 NOTE — PROGRESS NOTES
[facility], [place] B[place] Abran Morel, MD Lillia Ellison, KASSI Langford, [facility]     aMtilde [facility]   Ruddy Medellin DAMARIS [facility]       Tania Streeter [address]    at [address], [facility] Roxana Briscoe  22.    [phone]    FAX: [address] [place] [address], [address]    [phone]    FAX: [phone]       Patient Care Team:  Marlee Portillo MD as PCP - General (Internal Medicine)  David Shrestha MD (Nephrology)      Problem List  Date Reviewed: 12/29/2018          Codes Class Noted    Polycystic liver disease ICD-10-CM: Q44.6  ICD-9-CM: 751.62  7/12/2017        Polycystic kidney ICD-10-CM: Q61.3  ICD-9-CM: 753.12  7/12/2017        Hypertension ICD-10-CM: I10  ICD-9-CM: 401.9  7/12/2017        H/O arthroscopic knee surgery ICD-10-CM: Z98.890  ICD-9-CM: V45.89  7/12/2017        Stage 3 chronic kidney disease ([facility] [address] ICD-10-CM: N18.3  ICD-9-CM: 585.3  7/12/2017                Isabel Mcleod returns to the [address] for management of a Polycystic liver disease. The active problem list, all pertinent past medical history, medications, radiologic findings and laboratory findings related to the liver disorder were reviewed with the patient. The patient is a 62 y.o.  female with Polycystic liver and polycystic kidney disease. A CT scan demonstrated massive hepatomegaly with hundreds of cysts of various sizes throughout the liver. The patient was referred to [facility] in 2016 for possible aspiration of cysts. A few of the larger cysts were drained but this did nothing to relieve the abdominal fullness and distention.     The patient notes fatigue, diffuse abdominal pain, marked abdominal distention. The patient completes all daily activities without any functional limitations. The patient has not experienced fevers, chills,       ASSESSMENT AND PLAN:  Polycystic liver disease   The AST is elevated. The ALT is normal.  Alkaline phosphatase is normal.  Liver function is normal.  The platelet count is normal.    Serologic tests for various causes of chronic liver disease are negative. The patient is is very symptomatic from the very large liver with marked abdominal distention and abdominal pain. She does not have early satiety and weight loss. Discussed the natural history of polycystic liver and that there is no good treatment for the disorder short of liver transplant. Discussed that polycystic liver does not lead to chronic liver disease and liver failure. This is different than polycystic renal disease which is common cause of ESRD. Discussed the role of liver transplant. Discussed living donor liver transplant. Discussed surgical resection of the liver to reduce the size and  That the liver simply regenerates back to its former size within a few months. CKD  She is starting to develop progressive CKD from polycystic kidney disease. Scr is now at 1.58 gm. She can probably start to be considered for renal transplant in another 1-2 years. She could then be considered for a simultaneous liver and kidney transplant. Treatment of other medical problems in patients with chronic liver disease  There are no contraindications for the patient to take any medications that are necessary for treatment of other medical issues. Counseling for alcohol in patients with chronic liver disease  The patient was counseled regarding alcohol consumption and the effect of alcohol on chronic liver disease. The patient does not consume any significant amount of alcohol.     Vaccinations   Vaccination for viral hepatitis A and B is recommended since the patient has no serologic evidence of previous exposure or vaccination with immunity. Routine vaccinations against other bacterial and viral agents can be performed as indicated. Annual flu vaccination should be administered if indicated. ALLERGIES  Allergies   Allergen Reactions    Penicillins Hives       MEDICATIONS  Current Outpatient Medications   Medication Sig    enalapril (VASOTEC) 10 mg tablet Take  by mouth daily. No current facility-administered medications for this visit. SYSTEM REVIEW NOT RELATED TO LIVER DISEASE OR REVIEWED ABOVE:  Constitution systems: Negative for fever, chills, weight gain, weight loss. Eyes: Negative for visual changes. ENT: Negative for sore throat, painful swallowing. Respiratory: Negative for cough, hemoptysis, SOB. Cardiology: Negative for chest pain, palpitations. GI:  Negative for constipation or diarrhea. : Negative for urinary frequency, dysuria, hematuria, nocturia. Skin: Negative for rash. Hematology: Negative for easy bruising, blood clots. Musculo-skelatal: Negative for back pain, muscle pain, weakness. Neurologic: Negative for headaches, dizziness, vertigo, memory problems not related to HE. Psychology: Negative for anxiety, depression. FAMILY HISTORY:  The father  of PKD. The mother  at age 80 years. Two brothers have PKD. ONe has had a renal transplant. The other is on dialysis. There is no family history of cystic disease of the liver. SOCIAL HISTORY:  The patient is . The patient has 2 children,   The patient has never used tobacco products. The patient consumes alcohol on social occasions never in excess. The patient does not work.         PHYSICAL EXAMINATION:  Visit Vitals  /87 (BP 1 Location: Right arm, BP Patient Position: Sitting)   Pulse 87   Temp 99.4 °F (37.4 °C) (Tympanic)   Resp 16   Ht 5' 9.5\" (1.765 m)   Wt 287 lb (130.2 kg)   SpO2 98%   BMI 41.77 kg/m² General: No acute distress. Eyes: Sclera anicteric. ENT: No oral lesions. Thyroid normal.  Nodes: No adenopathy. Skin: No spider angiomata. No jaundice. No palmar erythema. Respiratory: Lungs clear to auscultation. Cardiovascular: Regular heart rate. No murmurs. No JVD. Abdomen: Markedly distended. The liver is hard. Hepatomegaly with the liver extending half way down the abdomen and across the midline. Extremities: No edema. No muscle wasting. No gross arthritic changes. Neurologic: Alert and oriented. Cranial nerves grossly intact. No asterixis. LABORATORY STUDIES:  Liver [place] of [address] Units 8/14/2019 8/6/2018   WBC 4.6 - 13.2 K/uL 6.4    ANC 1.8 - 8.0 K/UL 4.5    HGB 12.0 - 16.0 g/dL 12.8     - 420 K/uL 289    AST 10 - 38 U/L 29 43 (H)   ALT 13 - 56 U/L 56 56   Alk Phos 45 - 117 U/L 106 96   Bili, Total 0.2 - 1.0 MG/DL 0.5 0.4   Bili, Direct 0.0 - 0.2 MG/DL  0.1   Albumin 3.4 - 5.0 g/dL 3.5 3.5   BUN 7.0 - 18 MG/DL 30 (H) 30 (H)   Creat 0.6 - 1.3 MG/DL 1.58 (H) 1.42 (H)   Na 136 - 145 mmol/L 143 142   K 3.5 - 5.5 mmol/L 4.6 4.8   Cl 100 - 111 mmol/L 107 106   CO2 21 - 32 mmol/L 28 32   Glucose 74 - 99 mg/dL 89 98     SEROLOGIES:  Serologies Latest Ref Rng & Units 7/12/2017   Hep A Ab, Total NEGATIVE   NEGATIVE   Hep B Surface Ag <1.00 Index <0.10   Hep B Surface Ag Interp NEG   NEGATIVE   Hep B Core Ab, Total NEGATIVE   NEGATIVE   Hep B Surface Ab >10.0 mIU/mL <3.10 (L)   Hep B Surface Ab Interp POS   NEGATIVE (A)   Hep C Ab 0.0 - 0.9 s/co ratio <0.1   Ferritin 8 - 388 NG/ML 60   Iron % Saturation % 18   GLORIA, IFA  NEGATIVE   ASMCA 0 - 19 Units 15   Alpha-1 antitrypsin level 90 - 200 mg/dL 161     LIVER HISTOLOGY:  Not available or performed    ENDOSCOPIC PROCEDURES:  Not available or performed    RADIOLOGY:  12/2015. Ultrasound of liver. Hepatomegaly.   Numerous cysts throughout the liver and kidney consistent with polycystic liver and renal disease. 5/2017. Ultrasound of liver. Hepatomegaly. Numerous cysts throughout the liver. OTHER TESTING:  Not available or performed    FOLLOW-UP:  All of the issues listed above in the Assessment and Plan were discussed with the patient. All questions were answered. The patient expressed a clear understanding of the above.     [address] [facility] 87 in 12 months       Diamond Crawford MD  [address]  [address] [place] [address] 58, [address]  [address] [place] [address]